# Patient Record
Sex: FEMALE | URBAN - METROPOLITAN AREA
[De-identification: names, ages, dates, MRNs, and addresses within clinical notes are randomized per-mention and may not be internally consistent; named-entity substitution may affect disease eponyms.]

---

## 2024-06-03 ENCOUNTER — OFFICE VISIT (OUTPATIENT)
Dept: URBAN - METROPOLITAN AREA CLINIC 80 | Facility: CLINIC | Age: 34
End: 2024-06-03

## 2024-06-10 ENCOUNTER — DASHBOARD ENCOUNTERS (OUTPATIENT)
Age: 34
End: 2024-06-10

## 2024-06-10 ENCOUNTER — OFFICE VISIT (OUTPATIENT)
Dept: URBAN - METROPOLITAN AREA CLINIC 80 | Facility: CLINIC | Age: 34
End: 2024-06-10
Payer: COMMERCIAL

## 2024-06-10 VITALS
HEIGHT: 66 IN | DIASTOLIC BLOOD PRESSURE: 80 MMHG | WEIGHT: 280 LBS | SYSTOLIC BLOOD PRESSURE: 132 MMHG | BODY MASS INDEX: 45 KG/M2 | HEART RATE: 82 BPM | TEMPERATURE: 97.8 F

## 2024-06-10 DIAGNOSIS — K58.1 IRRITABLE BOWEL SYNDROME WITH CONSTIPATION: ICD-10-CM

## 2024-06-10 DIAGNOSIS — D50.9 IRON DEFICIENCY ANEMIA, UNSPECIFIED IRON DEFICIENCY ANEMIA TYPE: ICD-10-CM

## 2024-06-10 DIAGNOSIS — K59.09 CHRONIC CONSTIPATION: ICD-10-CM

## 2024-06-10 PROBLEM — 87522002: Status: ACTIVE | Noted: 2024-06-10

## 2024-06-10 PROBLEM — 440630006: Status: ACTIVE | Noted: 2024-06-10

## 2024-06-10 PROCEDURE — 99204 OFFICE O/P NEW MOD 45 MIN: CPT | Performed by: PHYSICIAN ASSISTANT

## 2024-06-10 NOTE — PHYSICAL EXAM CONSTITUTIONAL:
in no acute distress,  well developed, well nourished,  ambulating without difficulty,  normal communication ability home

## 2024-06-10 NOTE — HPI-TODAY'S VISIT:
Pts maternal grandmother had stomach ca Normal bowel habit is 1 BM q1-2 weeks during her menst cycle she has more frequent stools she has tried MOM - when she gets pain in her LLQ - every few weeks - it can clean her out but may have to take a second dose to get fully cleaned out nausea but no emesis does pass a little BRB at times, no melena - BRB on the tissue no rectal pain, just pressure  unaware if any colon ca or polyps in family no fevers or chills her LLQ pain does resolve with a BM started to take iron the end of last month - went to go give plasma but failed the iron test and so she started it on her own

## 2024-06-14 LAB
A/G RATIO: 1.4
ABSOLUTE BASOPHILS: 18
ABSOLUTE EOSINOPHILS: 67
ABSOLUTE LYMPHOCYTES: 1915
ABSOLUTE MONOCYTES: 549
ABSOLUTE NEUTROPHILS: 3550
ALBUMIN: 4
ALKALINE PHOSPHATASE: 75
ALT (SGPT): 31
AST (SGOT): 23
BASOPHILS: 0.3
BILIRUBIN, TOTAL: 0.5
BUN/CREATININE RATIO: (no result)
BUN: 10
C-REACTIVE PROTEIN, QUANT: <3
CALCIUM: 9.4
CARBON DIOXIDE, TOTAL: 24
CHLORIDE: 104
CREATININE: 0.7
EGFR: 118
EOSINOPHILS: 1.1
FERRITIN, SERUM: 129
GLOBULIN, TOTAL: 2.9
GLUCOSE: 82
HEMATOCRIT: 36.1
HEMOGLOBIN: 12.1
IRON BIND.CAP.(TIBC): 315
IRON SATURATION: 34
IRON: 106
LYMPHOCYTES: 31.4
MCH: 31.2
MCHC: 33.5
MCV: 93
MONOCYTES: 9
MPV: 9.5
NEUTROPHILS: 58.2
PLATELET COUNT: 231
POTASSIUM: 4.4
PROTEIN, TOTAL: 6.9
RDW: 12.9
RED BLOOD CELL COUNT: 3.88
SODIUM: 138
WHITE BLOOD CELL COUNT: 6.1

## 2024-06-24 ENCOUNTER — TELEPHONE ENCOUNTER (OUTPATIENT)
Dept: URBAN - METROPOLITAN AREA CLINIC 6 | Facility: CLINIC | Age: 34
End: 2024-06-24